# Patient Record
Sex: MALE | Race: WHITE
[De-identification: names, ages, dates, MRNs, and addresses within clinical notes are randomized per-mention and may not be internally consistent; named-entity substitution may affect disease eponyms.]

---

## 2022-09-28 ENCOUNTER — HOSPITAL ENCOUNTER (OUTPATIENT)
Dept: HOSPITAL 47 - RADNMMAIN | Age: 67
Discharge: HOME | End: 2022-09-28
Attending: INTERNAL MEDICINE
Payer: COMMERCIAL

## 2022-09-28 DIAGNOSIS — I21.29: Primary | ICD-10-CM

## 2022-09-28 PROCEDURE — 93017 CV STRESS TEST TRACING ONLY: CPT

## 2022-09-28 PROCEDURE — 78452 HT MUSCLE IMAGE SPECT MULT: CPT

## 2022-09-28 NOTE — CA
Exercise Stress Test Report 

 

 Name:    Bakari Boyce 

 

 MRN:    [id number] 

 

 Exam Date: 2022 09:57 

 

 Exam Location:      [place]  

                     Stress 

 

 Ht (in):     68     Wt (lb):     153    BSA:    1.82 

 

 Ordering Phys:       Masood Francois MD 

 

 Referring Phys:      Priscila,, 

 

 Technologist:        MICHELLE,, 

 

 Age:    67    Gender:    M 

 

 :    1955 

 Procedure CPT: 

 

 Indications:               

 

 ICD-10 Codes: 

 

 Patient History:          Abnormal EKG 

 

 Medications:              HZTZ,,,,,, LISINOPRIL,,,,, 

 

 Meds past 24 hrs: 

 

 Pretest Chest Pain: 

 

 STRESS TEST      [place] 

 

 Protocol 

 

 

 

 

 Exercise Duration (min:sec):         13:10 

 Max ST Depressions (mm): 

 Angina Score: 

 Cormier Score: 

 Resting HR (bpm):      61 

 

 Peak HR (bpm):         135 

 

 Resting BP (mmHg):       152    /   74 

 

 Peak BP (mmHg):       214   /   102 

 

 MPHR:    155     Target HR:      132 

 

 % MPHR:     87 

 METS:  14.2 

 

 Total Dose: 

 Peak Dose: 

 Atropine: 

 Double Product:       98228 

 

 BP Response: 

 

 Stress Termination:       TARGET HR REACHED/MAX EXERTION 

 

 Stress Symptoms: 

 NO SYMPTOMS 

 

 Stress Summary: 

 

 

  ECG ANALYSIS 

 

 Resting ECG: 

 

 Stress ECG: 

 

 CONCLUSIONS 

 Excellent exercise tolerance 

 Abnormal EKG and response to exercise with evidence off 1 mm  

 horizontal ST segment depression 

 

 Dr. Yaron Wolf MD 

 (Electronically Signed) 

 Final Date:      2022 11:38

## 2022-09-28 NOTE — NM
EXAMINATION TYPE: NM stress cardiolite complete

 

DATE OF EXAM: 9/28/2022

 

COMPARISON: NONE

 

HISTORY: Chest pain

 

TECHNIQUE:  After the intravenous administration of 9.21 mCi Tc 99m Sestamibi - Rest images obtained 
45 minutes post injection.  The patient exercised using a  JOSS protocol and 1 minute prior to peak 
exercise was injected with 25.1 mCi Tc 99m Sestamibi - Stress images obtained 15 minutes post injecti
on.

 

FINDINGS: 

 

Targeted heart rate was achieved during performance of the study. Review of stress and rest SPECT dequan
ges demonstrates no distinct perfusion abnormality.  Gated analysis shows normal wall motion with an 
estimated left ventricular ejection fraction of 67 %.

 

 

 

IMPRESSION:  

 

No scintigraphic evidence for reversible ischemia